# Patient Record
Sex: FEMALE | Race: WHITE | Employment: FULL TIME | URBAN - METROPOLITAN AREA
[De-identification: names, ages, dates, MRNs, and addresses within clinical notes are randomized per-mention and may not be internally consistent; named-entity substitution may affect disease eponyms.]

---

## 2018-10-24 ENCOUNTER — APPOINTMENT (OUTPATIENT)
Dept: CT IMAGING | Age: 38
End: 2018-10-24
Attending: PHYSICIAN ASSISTANT
Payer: SELF-PAY

## 2018-10-24 ENCOUNTER — HOSPITAL ENCOUNTER (EMERGENCY)
Age: 38
Discharge: HOME | End: 2018-10-24
Attending: EMERGENCY MEDICINE
Payer: SELF-PAY

## 2018-10-24 VITALS
WEIGHT: 199 LBS | HEART RATE: 76 BPM | BODY MASS INDEX: 33.15 KG/M2 | TEMPERATURE: 97.9 F | DIASTOLIC BLOOD PRESSURE: 76 MMHG | SYSTOLIC BLOOD PRESSURE: 121 MMHG | OXYGEN SATURATION: 100 % | RESPIRATION RATE: 14 BRPM | HEIGHT: 65 IN

## 2018-10-24 DIAGNOSIS — N20.0 NEPHROLITHIASIS: ICD-10-CM

## 2018-10-24 DIAGNOSIS — R10.9 FLANK PAIN: Primary | ICD-10-CM

## 2018-10-24 LAB
ALBUMIN SERPL-MCNC: 4.6 G/DL (ref 3.4–5)
ALBUMIN/GLOB SERPL: 1.5 {RATIO} (ref 0.8–1.7)
ALP SERPL-CCNC: 55 U/L (ref 45–117)
ALT SERPL-CCNC: 19 U/L (ref 13–56)
ANION GAP SERPL CALC-SCNC: 11 MMOL/L (ref 3–18)
APPEARANCE UR: CLEAR
AST SERPL-CCNC: 16 U/L (ref 15–37)
BASOPHILS # BLD: 0 K/UL (ref 0–0.1)
BASOPHILS NFR BLD: 0 % (ref 0–2)
BILIRUB SERPL-MCNC: 0.3 MG/DL (ref 0.2–1)
BILIRUB UR QL: NEGATIVE
BUN SERPL-MCNC: 15 MG/DL (ref 7–18)
BUN/CREAT SERPL: 21
CALCIUM SERPL-MCNC: 9.3 MG/DL (ref 8.5–10.1)
CHLORIDE SERPL-SCNC: 102 MMOL/L (ref 100–108)
CO2 SERPL-SCNC: 27 MMOL/L (ref 21–32)
COLOR UR: YELLOW
CREAT SERPL-MCNC: 0.71 MG/DL (ref 0.6–1.3)
DIFFERENTIAL METHOD BLD: ABNORMAL
EOSINOPHIL # BLD: 0.1 K/UL (ref 0–0.4)
EOSINOPHIL NFR BLD: 1 % (ref 0–5)
ERYTHROCYTE [DISTWIDTH] IN BLOOD BY AUTOMATED COUNT: 12.6 % (ref 11.6–14.5)
GLOBULIN SER CALC-MCNC: 3.1 G/DL (ref 2–4)
GLUCOSE SERPL-MCNC: 90 MG/DL (ref 74–99)
GLUCOSE UR STRIP.AUTO-MCNC: NEGATIVE MG/DL
HCT VFR BLD AUTO: 38.6 % (ref 35–45)
HGB BLD-MCNC: 12.9 G/DL (ref 12–16)
HGB UR QL STRIP: NEGATIVE
KETONES UR QL STRIP.AUTO: NEGATIVE MG/DL
LEUKOCYTE ESTERASE UR QL STRIP.AUTO: NEGATIVE
LYMPHOCYTES # BLD: 1.7 K/UL (ref 0.9–3.6)
LYMPHOCYTES NFR BLD: 29 % (ref 21–52)
MCH RBC QN AUTO: 30.1 PG (ref 24–34)
MCHC RBC AUTO-ENTMCNC: 33.4 G/DL (ref 31–37)
MCV RBC AUTO: 90.2 FL (ref 74–97)
MONOCYTES # BLD: 0.3 K/UL (ref 0.05–1.2)
MONOCYTES NFR BLD: 6 % (ref 3–10)
NEUTS SEG # BLD: 3.7 K/UL (ref 1.8–8)
NEUTS SEG NFR BLD: 64 % (ref 40–73)
NITRITE UR QL STRIP.AUTO: NEGATIVE
PH UR STRIP: 7 [PH] (ref 5–8)
PLATELET # BLD AUTO: 315 K/UL (ref 135–420)
PMV BLD AUTO: 9.1 FL (ref 9.2–11.8)
POTASSIUM SERPL-SCNC: 4.1 MMOL/L (ref 3.5–5.5)
PROT SERPL-MCNC: 7.7 G/DL (ref 6.4–8.2)
PROT UR STRIP-MCNC: NEGATIVE MG/DL
RBC # BLD AUTO: 4.28 M/UL (ref 4.2–5.3)
SODIUM SERPL-SCNC: 140 MMOL/L (ref 136–145)
SP GR UR REFRACTOMETRY: 1.01 (ref 1–1.03)
UROBILINOGEN UR QL STRIP.AUTO: 0.2 EU/DL (ref 0.2–1)
WBC # BLD AUTO: 5.7 K/UL (ref 4.6–13.2)

## 2018-10-24 PROCEDURE — 81003 URINALYSIS AUTO W/O SCOPE: CPT | Performed by: PHYSICIAN ASSISTANT

## 2018-10-24 PROCEDURE — 74176 CT ABD & PELVIS W/O CONTRAST: CPT

## 2018-10-24 PROCEDURE — 80053 COMPREHEN METABOLIC PANEL: CPT | Performed by: PHYSICIAN ASSISTANT

## 2018-10-24 PROCEDURE — 87491 CHLMYD TRACH DNA AMP PROBE: CPT | Performed by: PHYSICIAN ASSISTANT

## 2018-10-24 PROCEDURE — 96360 HYDRATION IV INFUSION INIT: CPT

## 2018-10-24 PROCEDURE — 85025 COMPLETE CBC W/AUTO DIFF WBC: CPT | Performed by: PHYSICIAN ASSISTANT

## 2018-10-24 PROCEDURE — 74011250636 HC RX REV CODE- 250/636: Performed by: PHYSICIAN ASSISTANT

## 2018-10-24 PROCEDURE — 99283 EMERGENCY DEPT VISIT LOW MDM: CPT

## 2018-10-24 RX ORDER — LISINOPRIL 10 MG/1
TABLET ORAL DAILY
COMMUNITY

## 2018-10-24 RX ORDER — ONDANSETRON 2 MG/ML
4 INJECTION INTRAMUSCULAR; INTRAVENOUS
Status: DISCONTINUED | OUTPATIENT
Start: 2018-10-24 | End: 2018-10-24 | Stop reason: HOSPADM

## 2018-10-24 RX ORDER — DICYCLOMINE HYDROCHLORIDE 20 MG/1
20 TABLET ORAL EVERY 6 HOURS
Qty: 20 TAB | Refills: 0 | Status: SHIPPED | OUTPATIENT
Start: 2018-10-24 | End: 2018-10-29

## 2018-10-24 RX ORDER — KETOROLAC TROMETHAMINE 15 MG/ML
15 INJECTION, SOLUTION INTRAMUSCULAR; INTRAVENOUS
Status: DISCONTINUED | OUTPATIENT
Start: 2018-10-24 | End: 2018-10-24 | Stop reason: HOSPADM

## 2018-10-24 RX ADMIN — SODIUM CHLORIDE 1000 ML: 900 INJECTION, SOLUTION INTRAVENOUS at 11:50

## 2018-10-24 NOTE — ED TRIAGE NOTES
Patient reports flank pain and abdominal pain along with nausea starting today. Denies any urinary symptoms.

## 2018-10-24 NOTE — DISCHARGE INSTRUCTIONS
Flank Pain: Care Instructions  Your Care Instructions  Flank pain is pain on the side of the back just below the rib cage and above the waist. It can be on one or both sides. Flank pain has many possible causes, including a kidney stone, a urinary tract infection, or back strain. Flank pain may get better on its own. But don't ignore new symptoms, such as fever, nausea and vomiting, urination problems, pain that gets worse, and dizziness. These may be signs of a more serious problem. You may have to have tests or other treatment. Follow-up care is a key part of your treatment and safety. Be sure to make and go to all appointments, and call your doctor if you are having problems. It's also a good idea to know your test results and keep a list of the medicines you take. How can you care for yourself at home? · Rest until you feel better. · Take pain medicines exactly as directed. ? If the doctor gave you a prescription medicine for pain, take it as prescribed. ? If you are not taking a prescription pain medicine, ask your doctor if you can take an over-the-counter pain medicine, such as acetaminophen (Tylenol), ibuprofen (Advil, Motrin), or naproxen (Aleve). Read and follow all instructions on the label. · If your doctor prescribed antibiotics, take them as directed. Do not stop taking them just because you feel better. You need to take the full course of antibiotics. · To apply heat, put a warm water bottle, a heating pad set on low, or a warm cloth on the painful area. Do not go to sleep with a heating pad on your skin. · To prevent dehydration, drink plenty of fluids, enough so that your urine is light yellow or clear like water. Choose water and other caffeine-free clear liquids until you feel better. If you have kidney, heart, or liver disease and have to limit fluids, talk with your doctor before you increase the amount of fluids you drink. When should you call for help?   Call your doctor now or seek immediate medical care if:    · Your flank pain gets worse.     · You have new symptoms, such as fever, nausea, or vomiting.     · You have symptoms of a urinary problem. For example:  ? You have blood or pus in your urine. ? You have chills or body aches. ? It hurts to urinate. ? You have groin or belly pain.    Watch closely for changes in your health, and be sure to contact your doctor if you do not get better as expected. Where can you learn more? Go to http://kim-bhavesh.info/. Enter S191 in the search box to learn more about \"Flank Pain: Care Instructions. \"  Current as of: November 20, 2017  Content Version: 11.8  © 0353-8795 [employer]. Care instructions adapted under license by [employer] (which disclaims liability or warranty for this information). If you have questions about a medical condition or this instruction, always ask your healthcare professional. [place] [address] any warranty or liability for your use of this information.

## 2018-10-24 NOTE — ED NOTES
I have reviewed discharge instructions with the patient. The patient verbalized understanding. Current Discharge Medication List  
  
START taking these medications Details  
dicyclomine (BENTYL) 20 mg tablet Take 1 Tab by mouth every six (6) hours for 20 doses. Qty: 20 Tab, Refills: 0 Discharge medications reviewed with patient and appropriate educational materials and side effects teaching were provided. Patient armband removed and shredded. Ambulated to lobby with steady gait accompanied by friend.

## 2018-10-24 NOTE — ED PROVIDER NOTES
EMERGENCY DEPARTMENT HISTORY AND PHYSICAL EXAM 
 
Date: 10/24/2018 Patient Name: Elaine Palafox History of Presenting Illness Chief Complaint Patient presents with  Flank Pain  Abdominal Pain I was personally available for consultation in the emergency department. I have reviewed the chart and agree with the documentation recorded by the [facility], including the assessment, treatment plan, and disposition. MICAH rGay DO History Provided By: Patient Chief Complaint: Back pain Duration: 3.5 Hours Timing:  Acute and Intermittent Location: Right lower back Severity: 5 out of 10 Associated Symptoms: nausea Additional History (Context):  
11:26 AM 
Elaine Palafox is a 40 y.o. female with PMHx of kidney stones and HTN who presents to the emergency department C/O intermittent right lower back pain (5/10) radiating through her right flank to her RLQ onset 3.5 hours ago. Associated sxs include nausea. Pt was seen at [facility] earlier today for same and was sent to this facility for further evaluation. Last BM was this morning. Last kidney stone was 2 years ago and she was able to pass it without surgical intervention. Reports pain is not similar to when she previously had kidney stones. Reports allergy to Penicillin. Pt denies dysuria, hematuria, urinary frequency, vomiting, fever, chills, known injury/trauma, and any other sxs or complaints. PCP: Stevan Moss MD 
 
Current Facility-Administered Medications Medication Dose Route Frequency Provider Last Rate Last Dose  ketorolac (TORADOL) injection 15 mg  15 mg IntraVENous NOW Javier Braga PA      
 ondansetron (ZOFRAN) injection 4 mg  4 mg IntraVENous NOW Halima Braga PA Current Outpatient Medications Medication Sig Dispense Refill  lisinopril (PRINIVIL, ZESTRIL) 10 mg tablet Take  by mouth daily.     
 dicyclomine (BENTYL) 20 mg tablet Take 1 Tab by mouth every six (6) hours for 20 doses. 20 Tab 0 Past History Past Medical History: 
Past Medical History:  
Diagnosis Date  Hypertension  Kidney stones  Unspecified adverse effect of anesthesia BP dropped after epidural   
 
 
Past Surgical History: 
Past Surgical History:  
Procedure Laterality Date  HX BILATERAL SALPINGO-OOPHORECTOMY  HX GYN    
 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 
History reviewed. No pertinent family history. Social History: 
Social History Tobacco Use  Smoking status: Former Smoker Packs/day: 0.25 Last attempt to quit: 2014 Years since quittin.4  Smokeless tobacco: Never Used Substance Use Topics  Alcohol use: Yes Alcohol/week: 6.0 oz Types: 12 Cans of beer per week Frequency: Never  Drug use: No  
 
 
Allergies: Allergies Allergen Reactions  Penicillins Anaphylaxis Throat swells Review of Systems Review of Systems Constitutional: Negative for chills and fever. Gastrointestinal: Positive for abdominal pain (RLQ) and nausea. Negative for vomiting. Genitourinary: Positive for flank pain (right). Negative for dysuria, frequency and hematuria. Musculoskeletal: Positive for back pain (right lower). All other systems reviewed and are negative. Physical Exam  
 
Vitals:  
 10/24/18 1115 BP: 139/83 Pulse: 86 Resp: 16 Temp: 97.4 °F (36.3 °C) SpO2: 100% Weight: 90.3 kg (199 lb) Height: 5' 5\" (1.651 m) Physical Exam  
Constitutional: She is oriented to person, place, and time. She appears well-developed and well-nourished. No distress. HENT:  
Head: Normocephalic and atraumatic. Eyes: Conjunctivae and EOM are normal. Pupils are equal, round, and reactive to light. Neck: Normal range of motion. Neck supple. Cardiovascular: Normal rate and regular rhythm. Pulmonary/Chest: Effort normal and breath sounds normal.  
Abdominal: Soft. Bowel sounds are normal. She exhibits no distension. There is no tenderness. There is no rebound and no guarding. Points to right flank as suite of pain but NO ttp Musculoskeletal: Normal range of motion. She exhibits no edema or tenderness. Neurological: She is alert and oriented to person, place, and time. Skin: Skin is warm and dry. No rash noted. Psychiatric: She has a normal mood and affect. Her behavior is normal.  
Nursing note and vitals reviewed. Diagnostic Study Results Labs - Recent Results (from the past 12 hour(s)) CBC WITH AUTOMATED DIFF Collection Time: 10/24/18 11:43 AM  
Result Value Ref Range WBC 5.7 4.6 - 13.2 K/uL  
 RBC 4.28 4.20 - 5.30 M/uL  
 HGB 12.9 12.0 - 16.0 g/dL HCT 38.6 35.0 - 45.0 % MCV 90.2 74.0 - 97.0 FL  
 MCH 30.1 24.0 - 34.0 PG  
 MCHC 33.4 31.0 - 37.0 g/dL  
 RDW 12.6 11.6 - 14.5 % PLATELET 485 535 - 424 K/uL MPV 9.1 (L) 9.2 - 11.8 FL  
 NEUTROPHILS 64 40 - 73 % LYMPHOCYTES 29 21 - 52 % MONOCYTES 6 3 - 10 % EOSINOPHILS 1 0 - 5 % BASOPHILS 0 0 - 2 %  
 ABS. NEUTROPHILS 3.7 1.8 - 8.0 K/UL  
 ABS. LYMPHOCYTES 1.7 0.9 - 3.6 K/UL  
 ABS. MONOCYTES 0.3 0.05 - 1.2 K/UL  
 ABS. EOSINOPHILS 0.1 0.0 - 0.4 K/UL  
 ABS. BASOPHILS 0.0 0.0 - 0.1 K/UL  
 DF AUTOMATED METABOLIC PANEL, COMPREHENSIVE Collection Time: 10/24/18 11:43 AM  
Result Value Ref Range Sodium 140 136 - 145 mmol/L Potassium 4.1 3.5 - 5.5 mmol/L Chloride 102 100 - 108 mmol/L  
 CO2 27 21 - 32 mmol/L Anion gap 11 3.0 - 18 mmol/L Glucose 90 74 - 99 mg/dL BUN 15 7.0 - 18 MG/DL Creatinine 0.71 0.6 - 1.3 MG/DL  
 BUN/Creatinine ratio 21 GFR est AA >60 >60 ml/min/1.73m2 GFR est non-AA >60 >60 ml/min/1.73m2 Calcium 9.3 8.5 - 10.1 MG/DL  Bilirubin, total 0.3 0.2 - 1.0 MG/DL  
 ALT (SGPT) 19 13 - 56 U/L  
 AST (SGOT) 16 15 - 37 U/L  
 Alk. phosphatase 55 45 - 117 U/L Protein, total 7.7 6.4 - 8.2 g/dL Albumin 4.6 3.4 - 5.0 g/dL Globulin 3.1 2.0 - 4.0 g/dL A-G Ratio 1.5 0.8 - 1.7 URINALYSIS W/ RFLX MICROSCOPIC Collection Time: 10/24/18 11:50 AM  
Result Value Ref Range Color YELLOW Appearance CLEAR Specific gravity 1.007 1.005 - 1.030    
 pH (UA) 7.0 5.0 - 8.0 Protein NEGATIVE  NEG mg/dL Glucose NEGATIVE  NEG mg/dL Ketone NEGATIVE  NEG mg/dL Bilirubin NEGATIVE  NEG Blood NEGATIVE  NEG Urobilinogen 0.2 0.2 - 1.0 EU/dL Nitrites NEGATIVE  NEG Leukocyte Esterase NEGATIVE  NEG Radiologic Studies -  
CT ABD PELV WO CONT Final Result IMPRESSION: 
 
Bilateral nephrolithiasis, however, there is no evidence of obstruction or 
obstructing stones on either side. The gallbladder and appendix appear normal. 
 
As read by the radiologist.  
 
CT Results  (Last 48 hours) 10/24/18 1232  CT ABD PELV WO CONT Final result Impression:  IMPRESSION:  
   
Bilateral nephrolithiasis, however, there is no evidence of obstruction or  
obstructing stones on either side. The gallbladder and appendix appear normal.  
   
   
  
 Narrative:  EXAM: CT of the abdomen and pelvis INDICATION: Abdominal/flank pain. Rule out stone COMPARISON: None. TECHNIQUE: Axial CT imaging of the abdomen and pelvis was performed without  
intravenous or oral contrast. Multiplanar reformats were generated. One or more dose reduction techniques were used on this CT: automated exposure  
control, adjustment of the mAs and/or kVp according to patient's size, and  
iterative reconstruction techniques. The specific techniques utilized on this CT  
exam have been documented in the patient's electronic medical record.   
_______________ FINDINGS:  
   
LOWER CHEST: Linear discoid atelectasis or scarring right middle lobe. LIVER, BILIARY: Liver is normal. No biliary dilation. Gallbladder is  
unremarkable. PANCREAS: Normal.  
   
SPLEEN: Normal.  
   
ADRENALS: Normal.  
   
KIDNEYS/URETERS/BLADDER: There are nonobstructing calcifications within both  
kidneys. Transient dilatation of the right ureter noted. The bladder appears unremarkable. PELVIC ORGANS: Uterus appears intact. VASCULATURE: Unremarkable LYMPH NODES: No enlarged lymph nodes. GASTROINTESTINAL TRACT: No bowel dilation or wall thickening. The appendix  
appears normal.  
   
BONES: No acute or aggressive osseous abnormalities identified. OTHER: None.  
_______________ CXR Results  (Last 48 hours) None Medications given in the ED- Medications  
ketorolac (TORADOL) injection 15 mg (not administered)  
ondansetron (ZOFRAN) injection 4 mg (not administered)  
sodium chloride 0.9 % bolus infusion 1,000 mL (1,000 mL IntraVENous New Bag 10/24/18 1150) Medical Decision Making I am the first provider for this patient. I reviewed the vital signs, available nursing notes, past medical history, past surgical history, family history and social history. Vital Signs-Reviewed the patient's vital signs. Pulse Oximetry Analysis - 100% on RA Records Reviewed: Nursing Notes and Old Medical Records Procedures: 
Procedures ED Course:  
11:26 AM Initial assessment performed. The patients presenting problems have been discussed, and they are in agreement with the care plan formulated and outlined with them. I have encouraged them to ask questions as they arise throughout their visit. Discussion: 
40 y.o female sent from urgent care for right flank pain for concern for appendicitis, afebrile, normal vitals, reassuring abd exam, work up negative to include CT abd/pel. Rx bentyl. Diagnosis and Disposition DISCHARGE NOTE: 
1:12 PM 
 Ina Jalloh's  results have been reviewed with her. She has been counseled regarding her diagnosis, treatment, and plan. She verbally conveys understanding and agreement of the signs, symptoms, diagnosis, treatment and prognosis and additionally agrees to follow up as discussed. She also agrees with the care-plan and conveys that all of her questions have been answered. I have also provided discharge instructions for her that include: educational information regarding their diagnosis and treatment, and list of reasons why they would want to return to the ED prior to their follow-up appointment, should her condition change. She has been provided with education for proper emergency department utilization. CLINICAL IMPRESSION: 
 
1. Flank pain PLAN: 
1. D/C Home 2. Current Discharge Medication List  
  
START taking these medications Details  
dicyclomine (BENTYL) 20 mg tablet Take 1 Tab by mouth every six (6) hours for 20 doses. Qty: 20 Tab, Refills: 0  
  
  
 
3. Follow-up Information Follow up With Specialties Details Why Contact Info Zeina Canales MD Family Practice Schedule an appointment as soon as possible for a visit in 3 days For primary care follow up. [address] [place] [address] [address] [place] [address] 
[phone] [facility] EMERGENCY DEPT Emergency Medicine Go to As needed, If symptoms worsen [address] [place] [address] 
[phone]  
  
 
_______________________________ Attestations: This note is prepared by Annalise Mora, acting as Scribe for Elijah Mascorro PA-C. Elijah Mascorro PA-C:  The scribe's documentation has been prepared under my direction and personally reviewed by me in its entirety. I confirm that the note above accurately reflects all work, treatment, procedures, and medical decision making performed by me. 
_______________________________

## 2018-10-24 NOTE — ED NOTES
Patient is awake, a/o x4. Talking with friend at the bedside. Patient states that she would like to hold off with the IV medication. Reports pain at 2/10.

## 2018-10-26 LAB
C TRACH RRNA SPEC QL NAA+PROBE: NEGATIVE
N GONORRHOEA RRNA SPEC QL NAA+PROBE: NEGATIVE
SPECIMEN SOURCE: NORMAL